# Patient Record
Sex: FEMALE | Race: WHITE
[De-identification: names, ages, dates, MRNs, and addresses within clinical notes are randomized per-mention and may not be internally consistent; named-entity substitution may affect disease eponyms.]

---

## 2020-09-19 ENCOUNTER — HOSPITAL ENCOUNTER (EMERGENCY)
Dept: HOSPITAL 8 - ED | Age: 30
Discharge: HOME | End: 2020-09-19
Payer: COMMERCIAL

## 2020-09-19 VITALS — HEIGHT: 61 IN | BODY MASS INDEX: 32.67 KG/M2 | WEIGHT: 173.06 LBS

## 2020-09-19 VITALS — DIASTOLIC BLOOD PRESSURE: 77 MMHG | SYSTOLIC BLOOD PRESSURE: 115 MMHG

## 2020-09-19 DIAGNOSIS — N76.0: Primary | ICD-10-CM

## 2020-09-19 DIAGNOSIS — R10.2: ICD-10-CM

## 2020-09-19 LAB
CLUE CELLS: (no result)
HCG UR SG: 1.02 (ref 1–1.03)
MICROSCOPIC: (no result)
T VAGINALIS RRNA GENITAL QL PROBE: (no result)
WET PREP WBCS: (no result)

## 2020-09-19 PROCEDURE — 99284 EMERGENCY DEPT VISIT MOD MDM: CPT

## 2020-09-19 PROCEDURE — 81001 URINALYSIS AUTO W/SCOPE: CPT

## 2020-09-19 PROCEDURE — 87591 N.GONORRHOEAE DNA AMP PROB: CPT

## 2020-09-19 PROCEDURE — 81025 URINE PREGNANCY TEST: CPT

## 2020-09-19 PROCEDURE — 87491 CHLMYD TRACH DNA AMP PROBE: CPT

## 2020-09-19 PROCEDURE — 87808 TRICHOMONAS ASSAY W/OPTIC: CPT

## 2020-09-19 PROCEDURE — 87210 SMEAR WET MOUNT SALINE/INK: CPT

## 2020-09-19 NOTE — NUR
PATIENT given discharge instructions and they have confirmed that they 
understand the instructions.  Patient ambulatory with steady gait. NAD, VSS, 
DENIES ADDITIONAL QUESTIONS OR NEEDS AT THIS TIME. NO BELONGINGS LEFT IN ROOM 
AFTER DC.

## 2020-09-19 NOTE — NUR
-------------------------------------------------------------------------------

           *** Note undone in [facility] - 09/19/20 at 0444 by MARCO ***            

-------------------------------------------------------------------------------

NOT IN [facility]

## 2020-09-19 NOTE — NUR
pt resting on gurney. NAD. appears comfortable, given ice water for comfort, 
given additional warm blankets for comfort, pelvic exam complete. pt tolerated 
well, samples walked to lab, waiting for results. chula.

## 2020-09-19 NOTE — NUR
PT AMBULATED BACK TO ROOM WITH A SMOOTH AND STEADY GAIT. PT REPORTS THINKING 
SHE HAD A YEAST INFECTION A FEW DAYS AGO, USED MONOSTAT LAST NIGHT AND BURNING 
IS WORSENING. PT REPORTS BURNING UPON URINATION AND IN GENERAL. PT ALSO REPORTS 
LIGHT SPOTTING. DENIES ABDOMINAL PAIN OR CRAMPING, DENIES N/V. PT GROSS NEURO 
INTACT. WCTM



PT PLACED ON SPO2/BP MONITORING. WCTM.

## 2020-10-16 ENCOUNTER — HOSPITAL ENCOUNTER (OUTPATIENT)
Dept: HOSPITAL 8 - RAD | Age: 30
Discharge: HOME | End: 2020-10-16
Attending: FAMILY MEDICINE
Payer: COMMERCIAL

## 2020-10-16 DIAGNOSIS — N93.8: ICD-10-CM

## 2020-10-16 DIAGNOSIS — N89.8: ICD-10-CM

## 2020-10-16 DIAGNOSIS — N85.4: Primary | ICD-10-CM

## 2020-10-16 PROCEDURE — 76830 TRANSVAGINAL US NON-OB: CPT

## 2020-10-24 ENCOUNTER — HOSPITAL ENCOUNTER (EMERGENCY)
Dept: HOSPITAL 8 - ED | Age: 30
Discharge: HOME | End: 2020-10-24
Payer: COMMERCIAL

## 2020-10-24 VITALS — BODY MASS INDEX: 32.09 KG/M2 | WEIGHT: 169.98 LBS | HEIGHT: 61 IN

## 2020-10-24 VITALS — DIASTOLIC BLOOD PRESSURE: 74 MMHG | SYSTOLIC BLOOD PRESSURE: 116 MMHG

## 2020-10-24 DIAGNOSIS — N89.8: ICD-10-CM

## 2020-10-24 DIAGNOSIS — R30.0: ICD-10-CM

## 2020-10-24 DIAGNOSIS — R53.83: ICD-10-CM

## 2020-10-24 DIAGNOSIS — B37.3: Primary | ICD-10-CM

## 2020-10-24 DIAGNOSIS — R10.2: ICD-10-CM

## 2020-10-24 DIAGNOSIS — R11.2: ICD-10-CM

## 2020-10-24 LAB
ALBUMIN SERPL-MCNC: 3.9 G/DL (ref 3.4–5)
ANION GAP SERPL CALC-SCNC: 6 MMOL/L (ref 5–15)
BASOPHILS # BLD AUTO: 0 X10^3/UL (ref 0–0.1)
BASOPHILS NFR BLD AUTO: 1 % (ref 0–1)
CALCIUM SERPL-MCNC: 8.8 MG/DL (ref 8.5–10.1)
CHLORIDE SERPL-SCNC: 111 MMOL/L (ref 98–107)
CLUE CELLS: (no result)
CREAT SERPL-MCNC: 0.83 MG/DL (ref 0.55–1.02)
EOSINOPHIL # BLD AUTO: 0.1 X10^3/UL (ref 0–0.4)
EOSINOPHIL NFR BLD AUTO: 1 % (ref 1–7)
ERYTHROCYTE [DISTWIDTH] IN BLOOD BY AUTOMATED COUNT: 13.4 % (ref 9.6–15.2)
LYMPHOCYTES # BLD AUTO: 2.7 X10^3/UL (ref 1–3.4)
LYMPHOCYTES NFR BLD AUTO: 29 % (ref 22–44)
MCH RBC QN AUTO: 29.1 PG (ref 27–34.8)
MCHC RBC AUTO-ENTMCNC: 32.9 G/DL (ref 32.4–35.8)
MD: NO
MICROSCOPIC: (no result)
MONOCYTES # BLD AUTO: 0.5 X10^3/UL (ref 0.2–0.8)
MONOCYTES NFR BLD AUTO: 6 % (ref 2–9)
NEUTROPHILS # BLD AUTO: 5.7 X10^3/UL (ref 1.8–6.8)
NEUTROPHILS NFR BLD AUTO: 63 % (ref 42–75)
PLATELET # BLD AUTO: 350 X10^3/UL (ref 130–400)
PMV BLD AUTO: 7.4 FL (ref 7.4–10.4)
RBC # BLD AUTO: 5.01 X10^6/UL (ref 3.82–5.3)
T VAGINALIS RRNA GENITAL QL PROBE: (no result)
WET PREP WBCS: (no result)

## 2020-10-24 PROCEDURE — 87491 CHLMYD TRACH DNA AMP PROBE: CPT

## 2020-10-24 PROCEDURE — 80048 BASIC METABOLIC PNL TOTAL CA: CPT

## 2020-10-24 PROCEDURE — 36415 COLL VENOUS BLD VENIPUNCTURE: CPT

## 2020-10-24 PROCEDURE — 87808 TRICHOMONAS ASSAY W/OPTIC: CPT

## 2020-10-24 PROCEDURE — 87591 N.GONORRHOEAE DNA AMP PROB: CPT

## 2020-10-24 PROCEDURE — 84703 CHORIONIC GONADOTROPIN ASSAY: CPT

## 2020-10-24 PROCEDURE — 85025 COMPLETE CBC W/AUTO DIFF WBC: CPT

## 2020-10-24 PROCEDURE — 82040 ASSAY OF SERUM ALBUMIN: CPT

## 2020-10-24 PROCEDURE — 87210 SMEAR WET MOUNT SALINE/INK: CPT

## 2020-10-24 PROCEDURE — 81003 URINALYSIS AUTO W/O SCOPE: CPT

## 2020-10-24 PROCEDURE — 99284 EMERGENCY DEPT VISIT MOD MDM: CPT
